# Patient Record
Sex: MALE | Race: WHITE | URBAN - METROPOLITAN AREA
[De-identification: names, ages, dates, MRNs, and addresses within clinical notes are randomized per-mention and may not be internally consistent; named-entity substitution may affect disease eponyms.]

---

## 2023-05-08 ENCOUNTER — NURSE TRIAGE (OUTPATIENT)
Dept: NURSING | Facility: CLINIC | Age: 23
End: 2023-05-08
Payer: COMMERCIAL

## 2023-05-08 NOTE — TELEPHONE ENCOUNTER
Triage call:    Patient calling to reporting having N/V/D & stomach cramping.  The patient reports his symptoms started about an hour ago.  He reports he ate some eggs from breakfast around 9 am this morning & reports that one of the eggs were already cracked.    The patient reports he has vomited about 2-3 times & has had diarrhea about 5 times.  He reports having intermittent stomach cramping & states that he feels feverish but denies having a thermometer at home.  The patient reports that his stomach cramps improve after having a BM.  At this time, he denies that he is having severe able pain.    Per protocol, writer advised patient to cont to monitor & treat his symptoms at home.    Katherin Olivia RN on 5/8/2023 at 11:41 AM    Reason for Disposition    MILD-MODERATE diarrhea (e.g., 1-6 times / day more than normal)    Additional Information    Negative: Shock suspected (e.g., cold/pale/clammy skin, too weak to stand, low BP, rapid pulse)    Negative: Difficult to awaken or acting confused (e.g., disoriented, slurred speech)    Negative: Sounds like a life-threatening emergency to the triager    Negative: SEVERE abdominal pain (e.g., excruciating) and present > 1 hour    Negative: SEVERE abdominal pain and age > 60 years    Negative: Bloody, black, or tarry bowel movements (Exception: chronic-unchanged black-grey bowel movements and is taking iron pills or Pepto-Bismol)    Negative: SEVERE diarrhea (e.g., 7 or more times / day more than normal) and age > 60 years    Negative: Constant abdominal pain lasting > 2 hours    Negative: Drinking very little and has signs of dehydration (e.g., no urine > 12 hours, very dry mouth, very lightheaded)    Negative: Patient sounds very sick or weak to the triager    Negative: SEVERE diarrhea (e.g., 7 or more times / day more than normal) and present > 24 hours (1 day)    Negative: MODERATE diarrhea (e.g., 4-6 times / day more than normal) and present > 48 hours (2 days)     Negative: MODERATE diarrhea (e.g., 4-6 times / day more than normal) and age > 70 years    Negative: Abdominal pain (Exception: Pain clears completely with each passage of diarrhea stool.)    Negative: Blood in the stool    Negative: Fever > 101 F (38.3 C)    Negative: Mucus or pus in stool has been present > 2 days and diarrhea is more than mild    Negative: Weak immune system (e.g., HIV positive, cancer chemo, splenectomy, organ transplant, chronic steroids)    Negative: Travel to a foreign country in past month    Negative: Recent antibiotic therapy (i.e., within last 2 months) and diarrhea present > 3 days since antibiotic was stopped    Negative: Recent hospitalization and diarrhea present > 3 days    Negative: Tube feedings (e.g., nasogastric, g-tube, j-tube)    Negative: MILD diarrhea (e.g., 1-3 or more stools than normal in past 24 hours) diarrhea without known cause and present > 7 days    Negative: Patient wants to be seen    Negative: Diarrhea is a chronic symptom (recurrent or ongoing AND lasting > 4 weeks)    Negative: SEVERE diarrhea (e.g., 7 or more times / day more than normal)    Protocols used: DIARRHEA-A-[facility]